# Patient Record
Sex: FEMALE | Race: WHITE | Employment: OTHER | URBAN - METROPOLITAN AREA
[De-identification: names, ages, dates, MRNs, and addresses within clinical notes are randomized per-mention and may not be internally consistent; named-entity substitution may affect disease eponyms.]

---

## 2017-09-16 ENCOUNTER — HOSPITAL ENCOUNTER (OUTPATIENT)
Dept: MAMMOGRAPHY | Age: 72
Discharge: HOME | End: 2017-09-16
Attending: OBSTETRICS & GYNECOLOGY
Payer: COMMERCIAL

## 2017-09-16 DIAGNOSIS — Z12.31 VISIT FOR SCREENING MAMMOGRAM: ICD-10-CM

## 2017-09-16 PROCEDURE — 77063 BREAST TOMOSYNTHESIS BI: CPT

## 2018-11-09 ENCOUNTER — HOSPITAL ENCOUNTER (OUTPATIENT)
Dept: MAMMOGRAPHY | Age: 73
Discharge: HOME | End: 2018-11-09
Attending: OBSTETRICS & GYNECOLOGY
Payer: MEDICARE

## 2018-11-09 DIAGNOSIS — Z12.31 VISIT FOR SCREENING MAMMOGRAM: ICD-10-CM

## 2018-11-09 PROCEDURE — 77063 BREAST TOMOSYNTHESIS BI: CPT

## 2019-12-10 ENCOUNTER — HOSPITAL ENCOUNTER (OUTPATIENT)
Dept: MAMMOGRAPHY | Age: 74
Discharge: HOME | End: 2019-12-10
Attending: FAMILY MEDICINE
Payer: MEDICARE

## 2019-12-10 DIAGNOSIS — Z85.3 HX OF BREAST CANCER: ICD-10-CM

## 2019-12-10 PROCEDURE — 77062 BREAST TOMOSYNTHESIS BI: CPT

## 2020-11-16 ENCOUNTER — TRANSCRIBE ORDER (OUTPATIENT)
Dept: SCHEDULING | Age: 75
End: 2020-11-16

## 2020-11-16 DIAGNOSIS — Z12.31 SCREENING MAMMOGRAM, ENCOUNTER FOR: Primary | ICD-10-CM

## 2020-12-21 ENCOUNTER — HOSPITAL ENCOUNTER (OUTPATIENT)
Dept: MAMMOGRAPHY | Age: 75
Discharge: HOME | End: 2020-12-21
Attending: OBSTETRICS & GYNECOLOGY
Payer: MEDICARE

## 2020-12-21 DIAGNOSIS — Z12.31 SCREENING MAMMOGRAM, ENCOUNTER FOR: ICD-10-CM

## 2020-12-21 PROCEDURE — 77063 BREAST TOMOSYNTHESIS BI: CPT

## 2022-01-04 ENCOUNTER — TRANSCRIBE ORDER (OUTPATIENT)
Dept: SCHEDULING | Age: 77
End: 2022-01-04

## 2022-01-04 DIAGNOSIS — Z12.31 VISIT FOR SCREENING MAMMOGRAM: Primary | ICD-10-CM

## 2022-01-06 ENCOUNTER — HOSPITAL ENCOUNTER (OUTPATIENT)
Dept: WOMENS IMAGING | Age: 77
Discharge: HOME | End: 2022-01-06
Attending: OBSTETRICS & GYNECOLOGY
Payer: MEDICARE

## 2022-01-06 DIAGNOSIS — Z12.31 VISIT FOR SCREENING MAMMOGRAM: ICD-10-CM

## 2022-01-06 PROCEDURE — 77063 BREAST TOMOSYNTHESIS BI: CPT

## 2023-01-04 ENCOUNTER — TRANSCRIBE ORDER (OUTPATIENT)
Dept: SCHEDULING | Age: 78
End: 2023-01-04

## 2023-01-04 DIAGNOSIS — Z12.31 ENCOUNTER FOR SCREENING MAMMOGRAM FOR MALIGNANT NEOPLASM OF BREAST: Primary | ICD-10-CM

## 2023-01-12 ENCOUNTER — HOSPITAL ENCOUNTER (OUTPATIENT)
Dept: WOMENS IMAGING | Age: 78
Discharge: HOME | End: 2023-01-12
Attending: OBSTETRICS & GYNECOLOGY
Payer: MEDICARE

## 2023-01-12 DIAGNOSIS — Z12.31 ENCOUNTER FOR SCREENING MAMMOGRAM FOR MALIGNANT NEOPLASM OF BREAST: ICD-10-CM

## 2023-01-12 PROCEDURE — 77063 BREAST TOMOSYNTHESIS BI: CPT

## 2023-11-17 ENCOUNTER — HOSPITAL ENCOUNTER (OUTPATIENT)
Facility: HOSPITAL | Age: 78
Setting detail: RECURRING SERIES
Discharge: HOME | End: 2023-11-20
Payer: MEDICARE

## 2023-11-17 PROCEDURE — 97162 PT EVAL MOD COMPLEX 30 MIN: CPT

## 2023-11-17 PROCEDURE — 97530 THERAPEUTIC ACTIVITIES: CPT

## 2023-11-17 NOTE — PROGRESS NOTES
PT DAILY TREATMENT NOTE/LUMBAR EVAL       Patient Name: Joselin Fletcher    Date: 2023    : 1945  Insurance: Payor: MEDICARE / Plan: MEDICARE PART A AND B / Product Type: *No Product type* /      Patient  verified yes     Visit #   Current / Total 1 10   Time   In / Out 10:25 11:20   Pain   In / Out 0 0   Subjective Functional Status/Changes: See POC   Changes to:  Meds, Allergies, Med Hx, Sx Hx?   If yes, update Summary List yes, see POC     Treatment Area: Vertigo [R42]    SUBJECTIVE    CC: impaired balance, dizziness  History/Mechanism of Injury: 2022- pt believes she had COVID, 2 month hearing loss  Increased onset of dizziness in 2023  Current Symptoms/Complaints:   2 months after COVID, rapid onset of hearing loss in left   Impaired balance onset in 2023- vertigo/dizziness   - dizziness/light headedness  Vestibular neuritis diagnosis from ENT  Meclizine  Passed out at [facility]- syncope diagnosis  Now anxious/panic attacks  Dizziness: with standing, difficulty in standing  Dysequilibrium: feels a wave upon turning head, (when walking, driving car,   Impaired Vision: decreased vision at baseline in right eye   Vertigo: no true room spinning  Nausea/Vomiting: only on   Hx of falls: no falls, but limiting her activities  Pain-   Aggravated By: head movements, driving, walking  Alleviated By: sitting, lying down  Previous Treatment/Compliance: Epley maneuvers, unsuccessful  PMHx/Surgical Hx: osteoporosis, anxiety, vestibular neuritis, 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

## 2023-11-17 NOTE — PROGRESS NOTES
[facility]  [address] [place] [address] Phone: [phone] Fax [phone] of Care / Statement of Necessity for Physical Therapy Services     Patient Name: Tameka Morris : 1945   Medical   Diagnosis: Vertigo [R42] Treatment Diagnosis: R42   Dizziness and giddiness     Onset Date: 23 (symptoms) Payor Payor: Katiana Izquierdo / Plan: MEDICARE PART A AND B / Product Type: *No Product type* /    Referral Source: Bridgett Wallace MD Start of [facility]): 2023   Prior Hospitalization: See medical history Provider #: [id number]   Prior Level of Function: Functionally independent, no dizziness with day to day activities   Comorbidities: osteoporosis, anxiety, vestibular neuritis, 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 female who presents with c/o dizziness. The patient reports an acute onset of left sided hearing loss in  following upper respiratory infection. Following this, the patient reports her balance was always altered, but she experienced significant attack of vertigo on 2023, in which she experienced severe dizziness, vertigo, and nausea. Her symptoms have since improved, but she cont to report inc dysequilibrium with head turns when walking and driving. Signs/symptoms at eval consistent with vestibular component to dizziness; pt diagnosed with vestibular neuritis by ENT. Functional deficits include: impaired standing balance, increased risk for falls, (+) head impulse testing. Rehab potential is good due to desire to attain PLOF. Pt would benefit from skilled PT to address above deficits to improve Pt's function and ability to return to PLOF with decreased pain and improved functional mobility.       Evaluation Complexity:  History:  HIGH Complexity :3+ comorbidities / personal factors

## 2023-12-05 ENCOUNTER — HOSPITAL ENCOUNTER (OUTPATIENT)
Facility: HOSPITAL | Age: 78
Setting detail: RECURRING SERIES
Discharge: HOME | End: 2023-12-08
Payer: MEDICARE

## 2023-12-05 PROCEDURE — 97112 NEUROMUSCULAR REEDUCATION: CPT

## 2023-12-05 NOTE — PROGRESS NOTES
PHYSICAL / OCCUPATIONAL THERAPY - DAILY TREATMENT NOTE (updated )    Patient Name: Sangeeta Matt    Date: 2023    : 1945  Insurance: Payor: MEDICARE / Plan: MEDICARE PART A AND B / Product Type: *No Product type* /      Patient  verified yes     Visit #   Current / Total 2 10 Total Time   Time   In / Out 3:00 3:40 40   Pain   In / Out 0 0    Subjective Functional Status/Changes: I am still dizzy when I walk, but I felt better driving here today. TREATMENT AREA =  Vertigo [R42]    OBJECTIVE      Therapeutic Procedures:  40  Total   Total MC BC Totals Reminder: bill using total billable min of TIMED therapeutic procedures (example: do not include dry needle or estim unattended, both untimed codes, in totals to left)  8-22 min = 1 unit; 23-37 min = 2 units; 38-52 min = 3 units; 53-67 min = 4 units; 68-82 min = 5 units   Tx Min Billable or 1:1 Min (if diff from Tx Min) Procedure, Rationale, Specifics   40  [id number] Neuromuscular Re-Education (timed):  improve balance, coordination, kinesthetic sense, posture, core stability and proprioception to improve patient's ability to develop conscious control of individual muscles and awareness of position of extremities in order to progress to PLOF and address remaining functional goals. (see flow sheet as applicable)     Details if applicable:           [x]  Patient Education billed concurrently with other procedures   [x] Review HEP    [] Progressed/Changed HEP, detail:    [] Other detail:       Objective Information/Functional Measures/Assessment    -Patient reports continued dizziness with head turns when walking.  She is able to appreciate improved balance during head turns when performing her home home program and community balance classes.  -Early signs of progress observed in [facility] romberg with head turns, though EC on foam frightening for patient and still very challenging, 10/10 on stability scale.  -Patient's fear of falling with eyes closed likely

## 2023-12-07 ENCOUNTER — HOSPITAL ENCOUNTER (OUTPATIENT)
Facility: HOSPITAL | Age: 78
Setting detail: RECURRING SERIES
Discharge: HOME | End: 2023-12-10
Payer: MEDICARE

## 2023-12-07 PROCEDURE — 97112 NEUROMUSCULAR REEDUCATION: CPT

## 2023-12-07 PROCEDURE — 97116 GAIT TRAINING THERAPY: CPT

## 2023-12-07 NOTE — PROGRESS NOTES
PHYSICAL / OCCUPATIONAL THERAPY - DAILY TREATMENT NOTE (updated )    Patient Name: Vilma Jacobson    Date: 2023    : 1945  Insurance: Payor: MEDICARE / Plan: MEDICARE PART A AND B / Product Type: *No Product type* /      Patient  verified yes     Visit #   Current / Total 3 10 Total Time   Time   In / Out 2:20 3:00 40   Pain   In / Out 0 0    Subjective Functional Status/Changes: I felt fine after last session     TREATMENT AREA =  Vertigo [R42]    OBJECTIVE    Therapeutic Procedures:  40  Total 40  Total  BC Totals Reminder: bill using total billable min of TIMED therapeutic procedures (example: do not include dry needle or estim unattended, both untimed codes, in totals to left)  8-22 min = 1 unit; 23-37 min = 2 units; 38-52 min = 3 units; 53-67 min = 4 units; 68-82 min = 5 units   Tx Min Billable or 1:1 Min (if diff from Tx Min) Procedure, Rationale, Specifics   28  [id number] Neuromuscular Re-Education (timed):  improve balance, coordination, kinesthetic sense, posture, core stability and proprioception to improve patient's ability to develop conscious control of individual muscles and awareness of position of extremities in order to progress to PLOF and address remaining functional goals.  (see flow sheet as applicable)     Details if applicable:       12  10018 Gait Training (timed):        40 feet x 2 of following-  [x] March            [] Lateral                   [x] Horizontal HT x 2  [] Tandem         [] Banded Lateral     [x] Vertical HT x 2  [x] Retrograde    [] Banded Monster   [x] UE Dual Task (Water Pour)         [x]  Patient Education billed concurrently with other procedures   [x] Review HEP    [] Progressed/Changed HEP, detail:    [] Other detail:       Objective Information/Functional Measures/Assessment    Patient reports large head turns create 5/10 perceived instability whereas smaller head turns create only 3/10 instability  -Able to perform EC Foam romberg for 8 seconds

## 2023-12-12 ENCOUNTER — APPOINTMENT (OUTPATIENT)
Facility: HOSPITAL | Age: 78
End: 2023-12-12
Payer: MEDICARE

## 2023-12-12 ENCOUNTER — HOSPITAL ENCOUNTER (OUTPATIENT)
Facility: HOSPITAL | Age: 78
Setting detail: RECURRING SERIES
Discharge: HOME | End: 2023-12-15
Payer: MEDICARE

## 2023-12-12 PROCEDURE — 97112 NEUROMUSCULAR REEDUCATION: CPT

## 2023-12-12 NOTE — PROGRESS NOTES
strategy primarily and stepping balance strategy when needed throughout activity to avoid contact with spear. NT  17832 Gait Training (timed):     40 feet x 2 of following-  [x] March            [] Lateral                   [x] Horizontal HT x 2  [] Tandem         [x] Banded Lateral     [x] Vertical HT x 2  [x] Retrograde    [] Banded Monster   [x] UE Dual Task (Water Pour)       [x]  Patient Education billed concurrently with other procedures   [x] Review HEP    [] Progressed/Changed HEP, detail:    [] Other detail:       Objective Information/Functional Measures/Assessment    -Patient most challenged today by retro and lateral limits of stability interventions.  -Patient able to perform vertical VOR at 140 bpm without loss of balance or blurred vision, but horizontal head turns could only be performed at 120 bpm with metronome before loss of balance. Patient will continue to benefit from skilled PT / OT services to modify and progress therapeutic interventions, analyze and address functional mobility deficits, analyze and address ROM deficits, analyze and address strength deficits, analyze and address soft tissue restrictions, analyze and cue for proper movement patterns, analyze and modify for postural abnormalities, analyze and address imbalance/dizziness, and instruct in home and community integration to address functional deficits and attain remaining goals. Progress toward goals / Updated goals:  []  See Progress Note/Recertification    Short Term Goals: To be accomplished in 4 weeks  - Goal: Pt to be compliant with initial HEP to improve ability to independently address symptoms and functional deficits. Status at last note/certification: Established and reviewed with Pt  Current: met, pt reports regular compliance (12/5/23)  - Goal: Pt to maintain EC Foam Romberg for 30\" without LOB to improve ease with brushing teeth in dark.   Status at last note/certification: 3\"  Current: progressing, 8

## 2023-12-19 ENCOUNTER — APPOINTMENT (OUTPATIENT)
Facility: HOSPITAL | Age: 78
End: 2023-12-19
Payer: MEDICARE

## 2023-12-21 ENCOUNTER — HOSPITAL ENCOUNTER (OUTPATIENT)
Facility: HOSPITAL | Age: 78
Setting detail: RECURRING SERIES
Discharge: HOME | End: 2023-12-24
Payer: MEDICARE

## 2023-12-28 ENCOUNTER — HOSPITAL ENCOUNTER (OUTPATIENT)
Facility: HOSPITAL | Age: 78
Setting detail: RECURRING SERIES
Discharge: HOME | End: 2023-12-31
Payer: MEDICARE

## 2023-12-28 PROCEDURE — 97116 GAIT TRAINING THERAPY: CPT

## 2023-12-28 PROCEDURE — 97112 NEUROMUSCULAR REEDUCATION: CPT

## 2023-12-28 NOTE — PROGRESS NOTES
PHYSICAL / OCCUPATIONAL THERAPY - DAILY TREATMENT NOTE (updated )    Patient Name: Wen Olsen    Date: 2023    : 1945  Insurance: Payor: MEDICARE / Plan: MEDICARE PART A AND B / Product Type: *No Product type* /      Patient  verified yes     Visit #   Current / Total 2 10 Total Time   Time   In / Out 2:22 3:00 38   Pain   In / Out 0 0    Subjective Functional Status/Changes: I am feeling like usual today.     TREATMENT AREA =  Vertigo [R42]    OBJECTIVE    Therapeutic Procedures:  38  Total 38  Total MC BC Totals Reminder: bill using total billable min of TIMED therapeutic procedures (example: do not include dry needle or estim unattended, both untimed codes, in totals to left)  8-22 min = 1 unit; 23-37 min = 2 units; 38-52 min = 3 units; 53-67 min = 4 units; 68-82 min = 5 units   Tx Min Billable or 1:1 Min (if diff from Tx Min) Procedure, Rationale, Specifics   26  41073 Neuromuscular Re-Education (timed):  improve balance, coordination, kinesthetic sense, posture, core stability and proprioception to improve patient's ability to develop conscious control of individual muscles and awareness of position of extremities in order to progress to PLOF and address remaining functional goals. (see flow sheet as applicable)     Details if applicable:         55948 Gait Training (timed):   40 feet x 2 of following-  [x] March            [x] VORx 1                 [x] Horizontal HT x 2  [x] Tandem         [] Banded Lateral     [x] Vertical HT x 2  [x] Retrograde    [] Banded Monster   [x] Dual Task              [x]  Patient Education billed concurrently with other procedures   [x] Review HEP    [] Progressed/Changed HEP, detail:    [] Other detail:       Objective Information/Functional Measures/Assessment    -Small loss of balance to right observed during tandem gait requiring CGA to correct.  -No difficulty reported with saccade gait training, but continued difficulty and deviations outside gait

## 2024-01-03 ENCOUNTER — APPOINTMENT (OUTPATIENT)
Facility: HOSPITAL | Age: 79
End: 2024-01-03
Payer: MEDICARE

## 2024-01-05 ENCOUNTER — HOSPITAL ENCOUNTER (OUTPATIENT)
Facility: HOSPITAL | Age: 79
Setting detail: RECURRING SERIES
Discharge: HOME | End: 2024-01-08
Payer: MEDICARE

## 2024-01-05 PROCEDURE — 97112 NEUROMUSCULAR REEDUCATION: CPT

## 2024-01-05 PROCEDURE — 97116 GAIT TRAINING THERAPY: CPT

## 2024-01-05 NOTE — PROGRESS NOTES
PHYSICAL / OCCUPATIONAL THERAPY - DAILY TREATMENT NOTE (updated )    Patient Name: Wen Olsen    Date: 2024    : 1945  Insurance: Payor: MEDICARE / Plan: MEDICARE PART A AND B / Product Type: *No Product type* /      Patient  verified yes     Visit #   Current / Total 3 10 Total Time   Time   In / Out 12:23 1:02 39   Pain   In / Out 0 0    Subjective Functional Status/Changes: I am feeling better     TREATMENT AREA =  Vertigo [R42]    OBJECTIVE    Therapeutic Procedures:  39  Total   Total MC BC Totals Reminder: bill using total billable min of TIMED therapeutic procedures (example: do not include dry needle or estim unattended, both untimed codes, in totals to left)  8-22 min = 1 unit; 23-37 min = 2 units; 38-52 min = 3 units; 53-67 min = 4 units; 68-82 min = 5 units   Tx Min Billable or 1:1 Min (if diff from Tx Min) Procedure, Rationale, Specifics   24  46356 Neuromuscular Re-Education (timed):  improve balance, coordination, kinesthetic sense, posture, core stability and proprioception to improve patient's ability to develop conscious control of individual muscles and awareness of position of extremities in order to progress to PLOF and address remaining functional goals. (see flow sheet as applicable)     Details if applicable:       15  35594 Gait Training (timed):        40 feet x 2 of following-  [x] March            [x] VORx 1                 [x] Horizontal HT x 2  [x] Tandem         [x] Alon Walk    [x] Vertical HT x 2  [x] Retrograde    [x] Geometry Practice          [x]  Patient Education billed concurrently with other procedures   [x] Review HEP    [] Progressed/Changed HEP, detail:    [] Other detail:       Objective Information/Functional Measures/Assessment    -Patient reports decreased sensation of fullness in head/ears within the past week. She also endorses improved ease with walking around grocery stores in the past week.  -Much improved ability with tandem walking today;

## 2024-01-10 ENCOUNTER — HOSPITAL ENCOUNTER (OUTPATIENT)
Facility: HOSPITAL | Age: 79
Setting detail: RECURRING SERIES
Discharge: HOME | End: 2024-01-13
Payer: MEDICARE

## 2024-01-10 PROCEDURE — 97116 GAIT TRAINING THERAPY: CPT

## 2024-01-10 PROCEDURE — 97112 NEUROMUSCULAR REEDUCATION: CPT

## 2024-01-10 NOTE — PROGRESS NOTES
PHYSICAL / OCCUPATIONAL THERAPY - DAILY TREATMENT NOTE (updated )    Patient Name: Wen Olsen    Date: 1/10/2024    : 1945  Insurance: Payor: MEDICARE / Plan: MEDICARE PART A AND B / Product Type: *No Product type* /      Patient  verified yes     Visit #   Current / Total 4 10 Total Time   Time   In / Out 12:21 1:01 40   Pain   In / Out 0 0    Subjective Functional Status/Changes: I definitely know this has helped me     TREATMENT AREA =  Vertigo [R42]    OBJECTIVE      Therapeutic Procedures:  40  Total 40  Total MC BC Totals Reminder: bill using total billable min of TIMED therapeutic procedures (example: do not include dry needle or estim unattended, both untimed codes, in totals to left)  8-22 min = 1 unit; 23-37 min = 2 units; 38-52 min = 3 units; 53-67 min = 4 units; 68-82 min = 5 units   Tx Min Billable or 1:1 Min (if diff from Tx Min) Procedure, Rationale, Specifics   24  83361 Neuromuscular Re-Education (timed):  improve balance, coordination, kinesthetic sense, posture, core stability and proprioception to improve patient's ability to develop conscious control of individual muscles and awareness of position of extremities in order to progress to PLOF and address remaining functional goals. (see flow sheet as applicable)     Details if applicable:       16  17243 Gait Training (timed):      40 feet x 2 of following-  [x] Change in Speed   [x] VORx 1                 [x] Horizontal HT x 2  [x] Tandem         [] Alon Walk    [x] Vertical HT x 2  [x] Retrograde    [x] Turn and Walk        [x]  Patient Education billed concurrently with other procedures   [x] Review HEP    [] Progressed/Changed HEP, detail:    [] Other detail:       Objective Information/Functional Measures/Assessment    Subjective Gains: improved confidence in balance, improved ease with walking  Subjective Deficits: difficulty balance with eyes closed, challenge with rapid head turns, feeling of unsteadiness and continued

## 2024-01-12 ENCOUNTER — HOSPITAL ENCOUNTER (OUTPATIENT)
Facility: HOSPITAL | Age: 79
Setting detail: RECURRING SERIES
Discharge: HOME | End: 2024-01-15
Payer: MEDICARE

## 2024-01-12 PROCEDURE — 97530 THERAPEUTIC ACTIVITIES: CPT

## 2024-01-12 PROCEDURE — 97112 NEUROMUSCULAR REEDUCATION: CPT

## 2024-01-12 NOTE — PROGRESS NOTES
[facility] [place]   [address] [place] [address]  Phone: [phone] Fax: [phone]  PROGRESS NOTE  Patient Name: Wen Olsen : 1945   Treatment/Medical Diagnosis: Vertigo [R42]   Referral Source: Alejandra Whiting MD Payor: Payor: MEDICARE / Plan: MEDICARE PART A AND B / Product Type: *No Product type* /    Date of Initial Visit: 23 Attended Visits: 10 Missed Visits: 0     SUMMARY OF TREATMENT  Pt is a pleasant 78 y.o. female who presents with c/o dizziness. Treatment has consisted of: therapeutic exercise to improve LE/lumbar strength/mobility; therapeutic activities to improve transfer/lift/carry ability; neuromuscular re-education to improve balance, core stability, neuromuscular control with lifting; physical agent/modality for symptom management; manual therapy for symptom relief and muscle flexibility; patient education to improve symptom management; self Care training; home safety training; stair training, and functional mobility training.    CURRENT STATUS  Patient has attended PT for 10 sessions for the treatment of impaired gait/mobility.  The patient demonstrates moderate progress at this time. She demonstrates tremendous improvement in dynamic and static balance abilities since starting therapy, but head turns continue to pose challenge during gait, especially horizontal head turns. Her eyes closed balance on foam has improved, indicating increased vestibular component, but she does have continued inconsistency on foam. Additional assessment includes:  Subjective Gains: improved confidence in balance, improved ease with walking  Subjective Deficits: difficulty balance with eyes closed, challenge with rapid head turns, feeling of unsteadiness and continued fear of falling, running into objects when walking  Objective Measures:   Gait:   Improved speed of gait since start of care, pt better able to perform visual 
Reynaldo PT [facility]   1/17/2024 11:40 AM Reynaldo Barros PT [facility]   1/19/2024 12:20 PM Reynaldo Barros, PT [facility]   1/24/2024 12:20 PM Reynaldo Barros, PT [facility]   1/26/2024 12:20 PM Reynaldo Barros, PT [facility]

## 2024-01-17 ENCOUNTER — HOSPITAL ENCOUNTER (OUTPATIENT)
Facility: HOSPITAL | Age: 79
Setting detail: RECURRING SERIES
Discharge: HOME | End: 2024-01-20
Payer: MEDICARE

## 2024-01-17 PROCEDURE — 97116 GAIT TRAINING THERAPY: CPT

## 2024-01-17 PROCEDURE — 97112 NEUROMUSCULAR REEDUCATION: CPT

## 2024-01-17 NOTE — PROGRESS NOTES
PHYSICAL / OCCUPATIONAL THERAPY - DAILY TREATMENT NOTE (updated )    Patient Name: Wen Olsen    Date: 2024    : 1945  Insurance: Payor: MEDICARE / Plan: MEDICARE PART A AND B / Product Type: *No Product type* /      Patient  verified yes     Visit #   Current / Total 1 10 Total Time   Time   In / Out 11:41 12:21 40   Pain   In / Out 0 0    Subjective Functional Status/Changes: My balance felt better after last session, my set back did not last long.     TREATMENT AREA =  Vertigo [R42]    OBJECTIVE      Therapeutic Procedures:  40  Total 40  Total MC BC Totals Reminder: bill using total billable min of TIMED therapeutic procedures (example: do not include dry needle or estim unattended, both untimed codes, in totals to left)  8-22 min = 1 unit; 23-37 min = 2 units; 38-52 min = 3 units; 53-67 min = 4 units; 68-82 min = 5 units   Tx Min Billable or 1:1 Min (if diff from Tx Min) Procedure, Rationale, Specifics   30  43536 Neuromuscular Re-Education (timed):  improve balance, coordination, kinesthetic sense, posture, core stability and proprioception to improve patient's ability to develop conscious control of individual muscles and awareness of position of extremities in order to progress to PLOF and address remaining functional goals. (see flow sheet as applicable)     Details if applicable:       10  70346 Gait Training (timed):    40 feet x 2 of following-  [x] Change in Speed   [x] VORx 1                 [x] Horizontal HT x 2  [x] Tandem         [] Alon Walk    [x] Vertical HT x 2  [x] VOR x 1 Retrograde Horizontal/Vertical   [x] Turn and Walk         [x]  Patient Education billed concurrently with other procedures   [x] Review HEP    [] Progressed/Changed HEP, detail:    [] Other detail:       Objective Information/Functional Measures/Assessment    - Added retrograde ambulation with head turns to address patient's deficits with negotiating small spaces within the home and visual

## 2024-01-19 ENCOUNTER — HOSPITAL ENCOUNTER (OUTPATIENT)
Facility: HOSPITAL | Age: 79
Setting detail: RECURRING SERIES
Discharge: HOME | End: 2024-01-22
Payer: MEDICARE

## 2024-01-19 PROCEDURE — 97112 NEUROMUSCULAR REEDUCATION: CPT

## 2024-01-19 NOTE — PROGRESS NOTES
PHYSICAL / OCCUPATIONAL THERAPY - DAILY TREATMENT NOTE (updated )    Patient Name: Wen Olsen    Date: 2024    : 1945  Insurance: Payor: MEDICARE / Plan: MEDICARE PART A AND B / Product Type: *No Product type* /      Patient  verified yes     Visit #   Current / Total 2 10 Total Time   Time   In / Out 12:20 1:00 40   Pain   In / Out 0 0    Subjective Functional Status/Changes: Doing pretty well today.     TREATMENT AREA =  Vertigo [R42]    OBJECTIVE      Therapeutic Procedures:  40  Total 38  Total MC BC Totals Reminder: bill using total billable min of TIMED therapeutic procedures (example: do not include dry needle or estim unattended, both untimed codes, in totals to left)  8-22 min = 1 unit; 23-37 min = 2 units; 38-52 min = 3 units; 53-67 min = 4 units; 68-82 min = 5 units   Tx Min Billable or 1:1 Min (if diff from Tx Min) Procedure, Rationale, Specifics   40 38 [id number] Neuromuscular Re-Education (timed):  improve balance, coordination, kinesthetic sense, posture, core stability and proprioception to improve patient's ability to develop conscious control of individual muscles and awareness of position of extremities in order to progress to PLOF and address remaining functional goals. (see flow sheet as applicable)     Details if applicable:       NT  64851 Gait Training (timed):         40 feet x 2 of following-  [x] Change in Speed   [x] VORx 1                 [x] Horizontal HT x 2  [x] Tandem         [] Alon Walk    [x] Vertical HT x 2  [x] VOR x 1 Retrograde Horizontal/Vertical   [x] Turn and Walk            [x]  Patient Education billed concurrently with other procedures   [x] Review HEP    [] Progressed/Changed HEP, detail:    [] Other detail:       Objective Information/Functional Measures/Assessment    -Added BOSU step ups and tandem balance to increase balance challenge in more unfamiliar balance setting.  -Attempt alt cone tap in fwd/retro position but pt unable to safely control

## 2024-01-24 ENCOUNTER — HOSPITAL ENCOUNTER (OUTPATIENT)
Facility: HOSPITAL | Age: 79
Setting detail: RECURRING SERIES
Discharge: HOME | End: 2024-01-27
Payer: MEDICARE

## 2024-01-24 PROCEDURE — 97116 GAIT TRAINING THERAPY: CPT

## 2024-01-24 PROCEDURE — 97112 NEUROMUSCULAR REEDUCATION: CPT

## 2024-01-24 NOTE — PROGRESS NOTES
2/6/2024 12:20 PM Reynaldo Barros, COSME [facility]   2/7/2024 11:15 AM [facility] RM 1 [facility]   2/9/2024 12:20 PM Reynaldo Barros, COSME [facility]

## 2024-01-26 ENCOUNTER — HOSPITAL ENCOUNTER (OUTPATIENT)
Facility: HOSPITAL | Age: 79
Setting detail: RECURRING SERIES
Discharge: HOME | End: 2024-01-29
Payer: MEDICARE

## 2024-01-26 PROCEDURE — 97116 GAIT TRAINING THERAPY: CPT

## 2024-01-26 PROCEDURE — 97112 NEUROMUSCULAR REEDUCATION: CPT

## 2024-02-06 ENCOUNTER — HOSPITAL ENCOUNTER (OUTPATIENT)
Facility: HOSPITAL | Age: 79
Setting detail: RECURRING SERIES
Discharge: HOME | End: 2024-02-09
Payer: MEDICARE

## 2024-02-06 PROCEDURE — 97116 GAIT TRAINING THERAPY: CPT

## 2024-02-06 PROCEDURE — 97112 NEUROMUSCULAR REEDUCATION: CPT

## 2024-02-06 NOTE — PROGRESS NOTES
PHYSICAL / OCCUPATIONAL THERAPY - DAILY TREATMENT NOTE (updated )    Patient Name: Wen Olsen    Date: 2024    : 1945  Insurance: Payor: MEDICARE / Plan: MEDICARE PART A AND B / Product Type: *No Product type* /      Patient  verified yes     Visit #   Current / Total 5 10 Total Time   Time   In / Out 12:22 1:00 38   Pain   In / Out 0 0    Subjective Functional Status/Changes: My trip went really well     TREATMENT AREA =  Vertigo [R42]    OBJECTIVE      Therapeutic Procedures:  38  Total 28  Total MC BC Totals Reminder: bill using total billable min of TIMED therapeutic procedures (example: do not include dry needle or estim unattended, both untimed codes, in totals to left)  8-22 min = 1 unit; 23-37 min = 2 units; 38-52 min = 3 units; 53-67 min = 4 units; 68-82 min = 5 units   Tx Min Billable or 1:1 Min (if diff from Tx Min) Procedure, Rationale, Specifics   25 [phone] [id number] Neuromuscular Re-Education (timed):  improve balance, coordination, kinesthetic sense, posture, core stability and proprioception to improve patient's ability to develop conscious control of individual muscles and awareness of position of extremities in order to progress to PLOF and address remaining functional goals. (see flow sheet as applicable)     Details if applicable:       116 Gait Training (timed):    40 feet x 2 of following-  [x] Compass Dual Task with Goniometer  [x] VORx 1                [x] Horizontal HT x 2  [x] Tandem         [x] Start/Stop    [x] Vertical HT x 2  [x] Retro Tandem          [x]  Patient Education billed concurrently with other procedures   [x] Review HEP    [] Progressed/Changed HEP, detail:    [] Other detail:       Objective Information/Functional Measures/Assessment    -Pt reports no balance difficulties during her trip even when walking in the airport.  -Patient reports that she feels appropriate to discharge to home program after next session.    Balance:   Tandem 30\" B  SLS 30\"

## 2024-02-07 ENCOUNTER — HOSPITAL ENCOUNTER (OUTPATIENT)
Dept: WOMENS IMAGING | Facility: HOSPITAL | Age: 79
Discharge: HOME | End: 2024-02-10
Payer: MEDICARE

## 2024-02-07 DIAGNOSIS — Z12.31 ENCOUNTER FOR SCREENING MAMMOGRAM FOR MALIGNANT NEOPLASM OF BREAST: ICD-10-CM

## 2024-02-07 PROCEDURE — 77063 BREAST TOMOSYNTHESIS BI: CPT

## 2024-02-09 ENCOUNTER — HOSPITAL ENCOUNTER (OUTPATIENT)
Facility: HOSPITAL | Age: 79
Setting detail: RECURRING SERIES
Discharge: HOME | End: 2024-02-12
Payer: MEDICARE

## 2024-02-09 PROCEDURE — 97112 NEUROMUSCULAR REEDUCATION: CPT

## 2024-02-09 PROCEDURE — 97530 THERAPEUTIC ACTIVITIES: CPT

## 2024-02-09 NOTE — PROGRESS NOTES
PHYSICAL / OCCUPATIONAL THERAPY - DAILY TREATMENT NOTE (updated )    Patient Name: Wen Olsen    Date: 2024    : 1945  Insurance: Payor: MEDICARE / Plan: MEDICARE PART A AND B / Product Type: *No Product type* /      Patient  verified yes     Visit #   Current / Total 6 10 Total Time   Time   In / Out 12:23 1:01 38   Pain   In / Out 0 0    Subjective Functional Status/Changes: It is graduation day.     TREATMENT AREA =  Vertigo [R42]    OBJECTIVE    Therapeutic Procedures:  38  Total 38  Total MC BC Totals Reminder: bill using total billable min of TIMED therapeutic procedures (example: do not include dry needle or estim unattended, both untimed codes, in totals to left)  8-22 min = 1 unit; 23-37 min = 2 units; 38-52 min = 3 units; 53-67 min = 4 units; 68-82 min = 5 units   Tx Min Billable or 1:1 Min (if diff from Tx Min) Procedure, Rationale, Specifics   10  58553 Therapeutic Activity (timed):  use of dynamic activities replicating functional movements to increase ROM, strength, coordination, balance, and proprioception in order to improve patient's ability to progress to PLOF and address remaining functional goals.  (see flow sheet as applicable)   Details if applicable:       28  87927 Neuromuscular Re-Education (timed):  improve balance, coordination, kinesthetic sense, posture, core stability and proprioception to improve patient's ability to develop conscious control of individual muscles and awareness of position of extremities in order to progress to PLOF and address remaining functional goals. (see flow sheet as applicable)     Details if applicable:           [x]  Patient Education billed concurrently with other procedures   [x] Review HEP    [] Progressed/Changed HEP, detail:    [] Other detail:       Objective Information/Functional Measures/Assessment    Pt attended therapy consistently for 16 visits for the treatment of impaired gait/mobility. At this point, the patient reports

## 2024-02-09 NOTE — PROGRESS NOTES
Physical Therapy Discharge Instructions  [facility]   [address] [address] [phone] fax    Patient: Wen Olsen  : 1945    Continue Home Exercise Program throughout the week.  HOME PROGRAM  - [place] Hip Kicks  - Heel Raises/Toe Raises  - [place] Lunges  - Walking With Head Turns  - Tandem Hold Eyes Open with Head Turns Horizontal/Vertical  - Feet Together Eyes Closed  - Feet Together Head Turns with Visual Fixation  - Single Leg Balance  - Tightrope Walk (close to bar support)  - March Walk (close to support)          Follow up with MD:     [] Upon completion of therapy  [x] As needed    Recommendations:     [x]   Return to activity with home program    Additional Comments: Wen, thank you for your hard work during your therapy sessions and congratulations on your progress! It has been a pleasure working with you and seeing you regain your confidence in your balance. Please reach out in the future if you have any questions.     Reynaldo Barros, PT 2024 12:31 PM

## 2024-02-13 NOTE — PROGRESS NOTES
[facility] [place]   [address], [place] [address]  Phone: [phone] Fax [phone]  DISCHARGE SUMMARY  Patient Name: Wen Olsen : 1945   Treatment/Medical Diagnosis: Vertigo [R42]   Referral Source: Alejandra Whiting MD     Date of Initial Visit: 23 Attended Visits: 16 Missed Visits: 0     SUMMARY OF TREATMENT  Pt is a pleasant 78 y.o. female who presents with c/o dizziness. Treatment has consisted of: therapeutic exercise to improve LE/lumbar strength/mobility; therapeutic activities to improve transfer/lift/carry ability; neuromuscular re-education to improve balance, core stability, neuromuscular control with lifting; physical agent/modality for symptom management; manual therapy for symptom relief and muscle flexibility; patient education to improve symptom management; self Care training; home safety training; stair training, and functional mobility training.    CURRENT STATUS  Pt attended therapy consistently for 16 visits for the treatment of impaired gait/mobility. At this point, the patient reports 87% improvement since SOC with specific improvements in the following areas: increased confidence in balance, improved standing balance, improved dynamic balance when walking, decreased dizziness with driving. The patient has also demonstrated improvement in her FOTO score from 48 pts to 64 pts, demonstrating improved function in the home and community. The patient is appropriate for discharge at this time with a comprehensive HEP and will follow up with our office about any questions that arise following discharge. Thank you for this referral.    - Goal: Pt to maintain EC Foam Romberg for 30\" without LOB to improve ease with brushing teeth in dark.  Status at last note/certification:  18\"   Current: met, 30\" (24)  - Goal: Pt to complete 4 Square Step Test in 12\" or less to improve dynamic standing balance.  Status

## 2024-11-23 NOTE — PROGRESS NOTES
PHYSICAL / OCCUPATIONAL THERAPY - DAILY TREATMENT NOTE (updated )    Patient Name: Wen Olsen    Date: 2024    : 1945  Insurance: Payor: MEDICARE / Plan: MEDICARE PART A AND B / Product Type: *No Product type* /      Patient  verified yes     Visit #   Current / Total 4 10 Total Time   Time   In / Out 12:21 1:01 40   Pain   In / Out 0 0    Subjective Functional Status/Changes: I am all ready for my trip. I will see how I do and then we can decide on any more therapy when I get back.     TREATMENT AREA =  Vertigo [R42]    OBJECTIVE    Therapeutic Procedures:  40  Total 38  Total MC BC Totals Reminder: bill using total billable min of TIMED therapeutic procedures (example: do not include dry needle or estim unattended, both untimed codes, in totals to left)  8-22 min = 1 unit; 23-37 min = 2 units; 38-52 min = 3 units; 53-67 min = 4 units; 68-82 min = 5 units   Tx Min Billable or 1:1 Min (if diff from Tx Min) Procedure, Rationale, Specifics   28 26 [id number] Neuromuscular Re-Education (timed):  improve balance, coordination, kinesthetic sense, posture, core stability and proprioception to improve patient's ability to develop conscious control of individual muscles and awareness of position of extremities in order to progress to PLOF and address remaining functional goals. (see flow sheet as applicable)     Details if applicable:       12  62516 Gait Training (timed):      40 feet x 2 of following-  [x] Compass Dual Task with Goniometer  [x] VORx 1                [x] Horizontal HT x 2  [x] Tandem         [x] Start/Stop    [x] Vertical HT x 2  [x] Retro Tandem           [x]  Patient Education billed concurrently with other procedures   [x] Review HEP    [] Progressed/Changed HEP, detail:    [] Other detail:       Objective Information/Functional Measures/Assessment    -Foam EC Romberg 24\" on average today  -Patient demonstrate improved gait today with compass dual task (moving goniometer handles to 
Female

## 2025-01-23 ENCOUNTER — TRANSCRIBE ORDERS (OUTPATIENT)
Facility: HOSPITAL | Age: 80
End: 2025-01-23

## 2025-01-23 DIAGNOSIS — Z12.31 OTHER SCREENING MAMMOGRAM: Primary | ICD-10-CM

## 2025-03-05 ENCOUNTER — HOSPITAL ENCOUNTER (OUTPATIENT)
Dept: WOMENS IMAGING | Facility: HOSPITAL | Age: 80
Discharge: HOME | End: 2025-03-08
Payer: MEDICARE

## 2025-03-05 DIAGNOSIS — Z12.31 OTHER SCREENING MAMMOGRAM: ICD-10-CM

## 2025-03-05 PROCEDURE — 77063 BREAST TOMOSYNTHESIS BI: CPT
